# Patient Record
Sex: FEMALE | Race: WHITE | NOT HISPANIC OR LATINO | Employment: FULL TIME | URBAN - METROPOLITAN AREA
[De-identification: names, ages, dates, MRNs, and addresses within clinical notes are randomized per-mention and may not be internally consistent; named-entity substitution may affect disease eponyms.]

---

## 2017-01-23 ENCOUNTER — ALLSCRIPTS OFFICE VISIT (OUTPATIENT)
Dept: OTHER | Facility: OTHER | Age: 37
End: 2017-01-23

## 2017-01-25 LAB
HPV 18 (HISTORICAL): NOT DETECTED
HPV HIGH RISK 16/18 (HISTORICAL): NOT DETECTED
HPV16 (HISTORICAL): NOT DETECTED
PAP (HISTORICAL): NORMAL

## 2018-01-15 VITALS
BODY MASS INDEX: 24.62 KG/M2 | DIASTOLIC BLOOD PRESSURE: 80 MMHG | SYSTOLIC BLOOD PRESSURE: 120 MMHG | HEIGHT: 70 IN | WEIGHT: 172 LBS

## 2018-01-29 ENCOUNTER — OFFICE VISIT (OUTPATIENT)
Dept: OBGYN CLINIC | Facility: MEDICAL CENTER | Age: 38
End: 2018-01-29
Payer: COMMERCIAL

## 2018-01-29 VITALS
WEIGHT: 175.2 LBS | DIASTOLIC BLOOD PRESSURE: 70 MMHG | SYSTOLIC BLOOD PRESSURE: 125 MMHG | BODY MASS INDEX: 25.08 KG/M2 | HEIGHT: 70 IN

## 2018-01-29 DIAGNOSIS — Z01.419 ENCOUNTER FOR GYNECOLOGICAL EXAMINATION WITHOUT ABNORMAL FINDING: Primary | ICD-10-CM

## 2018-01-29 PROCEDURE — S0612 ANNUAL GYNECOLOGICAL EXAMINA: HCPCS | Performed by: OBSTETRICS & GYNECOLOGY

## 2018-01-29 NOTE — PROGRESS NOTES
Assessment/Plan:    No problem-specific Assessment & Plan notes found for this encounter  Diagnoses and all orders for this visit:    Encounter for gynecological examination without abnormal finding    - normal exam   -  follow up in 1 year    Subjective:      Patient ID: Kelly Meehan is a 44 y o  female  Patient doing well - no concerns or complaints today  Kids are good  Has 3year old west retriever - Gerardine Bunting  Occ  SOM with cough, sneeze  Normal cycles  Last pap 2017, q5 years  Mammo age 35      Gynecologic Exam   The patient's pertinent negatives include no pelvic pain, vaginal bleeding or vaginal discharge  Pertinent negatives include no constipation, diarrhea, headaches or painful intercourse  She is sexually active  She uses condoms for contraception  Her menstrual history has been regular  The following portions of the patient's history were reviewed and updated as appropriate: allergies, current medications, past family history, past medical history, past social history, past surgical history and problem list     Past Medical History:   Diagnosis Date    Spontaneous      Varicella      Past Surgical History:   Procedure Laterality Date    TOOTH EXTRACTION       Social History     Social History    Marital status: /Civil Union     Spouse name: N/A    Number of children: N/A    Years of education: N/A     Occupational History    Not on file  Social History Main Topics    Smoking status: Never Smoker    Smokeless tobacco: Never Used    Alcohol use No      Comment: previous social drinker    Drug use: No    Sexual activity: Yes     Partners: Male     Other Topics Concern    Not on file     Social History Narrative    No narrative on file       Review of Systems   Respiratory: Negative for shortness of breath  Cardiovascular: Negative for leg swelling  Gastrointestinal: Negative for constipation and diarrhea     Genitourinary: Negative for pelvic pain and vaginal discharge  Musculoskeletal: Negative for arthralgias and myalgias  Neurological: Negative for dizziness and headaches  Objective:     Physical Exam   Constitutional: She appears well-developed and well-nourished  No distress  Pulmonary/Chest: No respiratory distress  Abdominal: Soft  There is no tenderness  Genitourinary: Vagina normal and uterus normal  No breast swelling, tenderness, discharge or bleeding  There is no rash or lesion on the right labia  There is no rash or lesion on the left labia  Cervix exhibits no motion tenderness and no discharge  Right adnexum displays no mass and no tenderness  Left adnexum displays no mass and no tenderness  Musculoskeletal: She exhibits no edema  Neurological: She is alert  Skin: Skin is warm and dry  Psychiatric: She has a normal mood and affect  Vitals reviewed

## 2018-12-06 ENCOUNTER — OFFICE VISIT (OUTPATIENT)
Dept: OBGYN CLINIC | Facility: MEDICAL CENTER | Age: 38
End: 2018-12-06
Payer: COMMERCIAL

## 2018-12-06 VITALS — BODY MASS INDEX: 26.26 KG/M2 | SYSTOLIC BLOOD PRESSURE: 132 MMHG | DIASTOLIC BLOOD PRESSURE: 70 MMHG | WEIGHT: 180.4 LBS

## 2018-12-06 DIAGNOSIS — N91.2 AMENORRHEA: Primary | ICD-10-CM

## 2018-12-06 PROCEDURE — 76801 OB US < 14 WKS SINGLE FETUS: CPT | Performed by: PHYSICIAN ASSISTANT

## 2018-12-06 NOTE — ASSESSMENT & PLAN NOTE
Confirmed IUP  Will sched f/u with nurse for intake, first prenatal visit  Enc start PNV if has not already, teratogen avoidance  To notify w/ any bleeding, pelvic pain

## 2018-12-06 NOTE — PROGRESS NOTES
Assessment/Plan:    Amenorrhea  Confirmed IUP  Will sched f/u with nurse for intake, first prenatal visit  Enc start PNV if has not already, teratogen avoidance  To notify w/ any bleeding, pelvic pain         Diagnoses and all orders for this visit:    Amenorrhea    Other orders  -     Prenatal Vit-Fe Fumarate-FA (PRENATAL VITAMIN PO); Take by mouth        Subjective:      Patient ID: Bakari Langford is a 43 y o  female  Technician: Study performed by the interpreting physician assistant     Indications:  amenorrhea         LMP 18          Procedure Details  A gestational sac is seen containing a yolk sac and a gonzalez embryo  The embryonic crown-rump length measures 2 9 cm  and calculates to an estimated gestational age of 10 weeks and 6  Days  Embryonic cardiac activity is present @ 180 BPM  Description of fetal anatomy Normal  Description of ovaries: normal  Description of uterus: normal     Findings:  Viable, gonzalez intrauterine pregnancy at 9weeks,  4 days, (no change to [facility])  with a calculated EDC of 19             The following portions of the patient's history were reviewed and updated as appropriate: allergies, current medications, past family history, past medical history, past social history, past surgical history and problem list     Review of Systems   Constitutional: Negative for appetite change, fatigue and unexpected weight change  Respiratory: Negative for chest tightness and shortness of breath  Cardiovascular: Negative for chest pain, palpitations and leg swelling  Gastrointestinal: Negative for abdominal pain, constipation, diarrhea, nausea and vomiting  Genitourinary: Negative for difficulty urinating, dyspareunia, menstrual problem, pelvic pain and vaginal discharge  Musculoskeletal: Negative for arthralgias and myalgias  Neurological: Negative for dizziness, light-headedness and headaches  Psychiatric/Behavioral: Negative for dysphoric mood   The patient is not nervous/anxious  All other systems reviewed and are negative  Objective:      /70 (BP Location: Right arm, Patient Position: Sitting, Cuff Size: Standard)   Wt 81 8 kg (180 lb 6 4 oz)   LMP 09/30/2018   Breastfeeding? No   BMI 26 26 kg/m²          Physical Exam   Constitutional: She is oriented to person, place, and time  She appears well-developed and well-nourished  HENT:   Head: Normocephalic and atraumatic  Neurological: She is alert and oriented to person, place, and time  Skin: Skin is warm and dry  Psychiatric: She has a normal mood and affect  Nursing note and vitals reviewed

## 2018-12-18 ENCOUNTER — APPOINTMENT (OUTPATIENT)
Dept: LAB | Facility: MEDICAL CENTER | Age: 38
End: 2018-12-18
Payer: COMMERCIAL

## 2018-12-18 ENCOUNTER — INITIAL PRENATAL (OUTPATIENT)
Dept: OBGYN CLINIC | Facility: MEDICAL CENTER | Age: 38
End: 2018-12-18

## 2018-12-18 VITALS
DIASTOLIC BLOOD PRESSURE: 68 MMHG | RESPIRATION RATE: 14 BRPM | SYSTOLIC BLOOD PRESSURE: 120 MMHG | HEIGHT: 70 IN | WEIGHT: 179 LBS | BODY MASS INDEX: 25.62 KG/M2

## 2018-12-18 DIAGNOSIS — Z34.81 ENCOUNTER FOR SUPERVISION OF OTHER NORMAL PREGNANCY, FIRST TRIMESTER: ICD-10-CM

## 2018-12-18 DIAGNOSIS — Z34.91 FIRST TRIMESTER PREGNANCY: ICD-10-CM

## 2018-12-18 DIAGNOSIS — Z34.91 FIRST TRIMESTER PREGNANCY: Primary | ICD-10-CM

## 2018-12-18 LAB
ABO GROUP BLD: NORMAL
BACTERIA UR QL AUTO: NORMAL /HPF
BASOPHILS # BLD AUTO: 0.04 THOUSANDS/ΜL (ref 0–0.1)
BASOPHILS NFR BLD AUTO: 0 % (ref 0–1)
BILIRUB UR QL STRIP: NEGATIVE
BLD GP AB SCN SERPL QL: NEGATIVE
CLARITY UR: CLEAR
COLOR UR: YELLOW
EOSINOPHIL # BLD AUTO: 0.01 THOUSAND/ΜL (ref 0–0.61)
EOSINOPHIL NFR BLD AUTO: 0 % (ref 0–6)
ERYTHROCYTE [DISTWIDTH] IN BLOOD BY AUTOMATED COUNT: 12.7 % (ref 11.6–15.1)
GLUCOSE UR STRIP-MCNC: NEGATIVE MG/DL
HBV SURFACE AG SER QL: NORMAL
HCT VFR BLD AUTO: 39.8 % (ref 34.8–46.1)
HGB BLD-MCNC: 13.2 G/DL (ref 11.5–15.4)
HGB UR QL STRIP.AUTO: NEGATIVE
IMM GRANULOCYTES # BLD AUTO: 0.04 THOUSAND/UL (ref 0–0.2)
IMM GRANULOCYTES NFR BLD AUTO: 0 % (ref 0–2)
KETONES UR STRIP-MCNC: NEGATIVE MG/DL
LEUKOCYTE ESTERASE UR QL STRIP: NEGATIVE
LYMPHOCYTES # BLD AUTO: 1.47 THOUSANDS/ΜL (ref 0.6–4.47)
LYMPHOCYTES NFR BLD AUTO: 15 % (ref 14–44)
MCH RBC QN AUTO: 30.9 PG (ref 26.8–34.3)
MCHC RBC AUTO-ENTMCNC: 33.2 G/DL (ref 31.4–37.4)
MCV RBC AUTO: 93 FL (ref 82–98)
MONOCYTES # BLD AUTO: 0.48 THOUSAND/ΜL (ref 0.17–1.22)
MONOCYTES NFR BLD AUTO: 5 % (ref 4–12)
NEUTROPHILS # BLD AUTO: 7.66 THOUSANDS/ΜL (ref 1.85–7.62)
NEUTS SEG NFR BLD AUTO: 80 % (ref 43–75)
NITRITE UR QL STRIP: NEGATIVE
NON-SQ EPI CELLS URNS QL MICRO: NORMAL /HPF
NRBC BLD AUTO-RTO: 0 /100 WBCS
PH UR STRIP.AUTO: 6 [PH] (ref 4.5–8)
PLATELET # BLD AUTO: 204 THOUSANDS/UL (ref 149–390)
PMV BLD AUTO: 12.8 FL (ref 8.9–12.7)
PROT UR STRIP-MCNC: NEGATIVE MG/DL
RBC # BLD AUTO: 4.27 MILLION/UL (ref 3.81–5.12)
RBC #/AREA URNS AUTO: NORMAL /HPF
RH BLD: POSITIVE
RUBV IGG SERPL IA-ACNC: 84 IU/ML
SP GR UR STRIP.AUTO: 1.03 (ref 1–1.03)
SPECIMEN EXPIRATION DATE: NORMAL
UROBILINOGEN UR QL STRIP.AUTO: 0.2 E.U./DL
WBC # BLD AUTO: 9.7 THOUSAND/UL (ref 4.31–10.16)
WBC #/AREA URNS AUTO: NORMAL /HPF

## 2018-12-18 PROCEDURE — 87086 URINE CULTURE/COLONY COUNT: CPT

## 2018-12-18 PROCEDURE — 80081 OBSTETRIC PANEL INC HIV TSTG: CPT

## 2018-12-18 PROCEDURE — 36415 COLL VENOUS BLD VENIPUNCTURE: CPT

## 2018-12-18 PROCEDURE — OBC: Performed by: OBSTETRICS & GYNECOLOGY

## 2018-12-18 PROCEDURE — 81001 URINALYSIS AUTO W/SCOPE: CPT

## 2018-12-18 NOTE — PROGRESS NOTES
OB INTAKE INTERVIEW  * Pt presents for OB intake YES  * Accompanied by: By her 3year old daughter Jazmin Thompson   *G 5 P2  *Hx of  delivery prior to 36 weeks 6 days  NO  *Last Menstrual Period: Pt's LMP was 2018  *Ultrasound date:2018   9 weeks  6 days  *Estimated date of delivery: 2019   * confirmed by LMP     *Signs/Symptoms of Pregnancy   * NONE   *constipation NO   *headaches NO   *cramping/spotting NO   *PICA cravings NO  *Diabetes- if you answer yes, please order 1 hour GTT, 50g   *hx of GDM NO   *BMI >35  NO   *first degree relative with type 2 diabetes  YES Patient reports that her Paternal Aunt has type 2 diabetes and her PGM has type 2 diabetes  *hx of PCOS NO   *current metformin use NO   *prior hx of LGA/macrosomia NO   *AMA with other risk factors YES Patient is 43years old she will be 35years old by the time she delivers  *Hypertension- if you answer yes, please order preeclampsia labs including 24 hour urine protein   *Hx of chronic HTN NO   *hx of gestational HTN YES with her son pregnancy    *hx of preeclampsia, eclampsia, or HELLP syndrome NO  *Infection Screening-    *does the pt have a hx of MRSA? NO   *if yes- please follow MRSA protocol and obtain a nasal swab for MRSA culture  *Immunizations:   *influenza vaccine given today NO   *discussed Tdap vaccine YES    *Interview education   *[facility] Pregnancy Essentials Book reviewed and discussed   *Handouts given: YES    *Baby and Me phone ivy guide YES    *Baby and Me support center YEs    *[facility] M     *discussed genetic testing- pt interested  YES     *appointment at [place] [id number] made YES    *Prenatal lab work scripts Given at today's visit and patient is doing her prenatal blood work after the [facility] Intake visit  *I have these concerns about this prenatal patient:  Patient has gestational high blood pressure with her first pregnancy     *Details that I feel the provider should be aware of: Patient son has Autism she had two vaginal deliveries   PN1 visit scheduled  The patient was oriented to our practice and all questions were answered  PN1 was schedule at today's visit  Interviewed by: YEFRI HOLDEN    Patient reports that this was a surprise pregnancy they were not even trying but they are very happy   Patient reports that no one in the family know yet of the big news they are going to tell the children on fahad day   Patient is planning to breast feed the baby she reports that they varma not have any cats at the house she does have a dog   Patient reports that they do not have any traveling plans during her pregnancy

## 2018-12-19 LAB
BACTERIA UR CULT: NORMAL
HIV 1+2 AB+HIV1 P24 AG SERPL QL IA: NORMAL
RPR SER QL: NORMAL

## 2019-01-03 ENCOUNTER — INITIAL PRENATAL (OUTPATIENT)
Dept: OBGYN CLINIC | Facility: MEDICAL CENTER | Age: 39
End: 2019-01-03

## 2019-01-03 ENCOUNTER — ROUTINE PRENATAL (OUTPATIENT)
Dept: PERINATAL CARE | Facility: MEDICAL CENTER | Age: 39
End: 2019-01-03
Payer: COMMERCIAL

## 2019-01-03 VITALS
BODY MASS INDEX: 26.66 KG/M2 | HEIGHT: 69 IN | DIASTOLIC BLOOD PRESSURE: 61 MMHG | HEART RATE: 98 BPM | WEIGHT: 180 LBS | SYSTOLIC BLOOD PRESSURE: 128 MMHG

## 2019-01-03 VITALS — SYSTOLIC BLOOD PRESSURE: 120 MMHG | WEIGHT: 180 LBS | BODY MASS INDEX: 26.58 KG/M2 | DIASTOLIC BLOOD PRESSURE: 72 MMHG

## 2019-01-03 DIAGNOSIS — Z34.91 FIRST TRIMESTER PREGNANCY: ICD-10-CM

## 2019-01-03 DIAGNOSIS — Z3A.13 13 WEEKS GESTATION OF PREGNANCY: ICD-10-CM

## 2019-01-03 DIAGNOSIS — Z3A.13 13 WEEKS GESTATION OF PREGNANCY: Primary | ICD-10-CM

## 2019-01-03 DIAGNOSIS — Z36.82 ENCOUNTER FOR NUCHAL TRANSLUCENCY TESTING: ICD-10-CM

## 2019-01-03 DIAGNOSIS — Z87.59 HISTORY OF GESTATIONAL HYPERTENSION: ICD-10-CM

## 2019-01-03 DIAGNOSIS — O09.521 ELDERLY MULTIGRAVIDA IN FIRST TRIMESTER: Primary | ICD-10-CM

## 2019-01-03 PROBLEM — N91.2 AMENORRHEA: Status: RESOLVED | Noted: 2018-12-06 | Resolved: 2019-01-03

## 2019-01-03 PROCEDURE — 76801 OB US < 14 WKS SINGLE FETUS: CPT | Performed by: OBSTETRICS & GYNECOLOGY

## 2019-01-03 PROCEDURE — 76813 OB US NUCHAL MEAS 1 GEST: CPT | Performed by: OBSTETRICS & GYNECOLOGY

## 2019-01-03 PROCEDURE — 99241 PR OFFICE CONSULTATION NEW/ESTAB PATIENT 15 MIN: CPT | Performed by: OBSTETRICS & GYNECOLOGY

## 2019-01-03 PROCEDURE — PNV: Performed by: PHYSICIAN ASSISTANT

## 2019-01-03 PROCEDURE — 87591 N.GONORRHOEAE DNA AMP PROB: CPT | Performed by: PHYSICIAN ASSISTANT

## 2019-01-03 PROCEDURE — 87491 CHLMYD TRACH DNA AMP PROBE: CPT | Performed by: PHYSICIAN ASSISTANT

## 2019-01-03 NOTE — PROGRESS NOTES
The patient was seen today for an ultrasound  Please see ultrasound report (located under Ob Procedures) for additional details  Thank you very much for allowing us to participate in the care of this very nice patient  Should you have any questions, please do not hesitate to contact me  Eder Daugherty MD [address] [facility]  Attending Physician, Cristal

## 2019-01-03 NOTE — PROGRESS NOTES
13 weeks gestation of pregnancy  Pt presents for first prenatal visit  PNL completed, reviewed - WNL  Rh pos  PAP up to date  GC/CT cx today  Declines flu vacc    No VB or pelvic pain per pt  Reviewed reasons to call    Elderly multigravida in first trimester  Had genetics consult w/ MFM  Plans to complete cell free DNA testing  NT today - WNL    History of gestational hypertension  H/o gHTN in 1st pregnancy  Will check baseline labs/urine studies  To start ASA regimen as d/w M

## 2019-01-03 NOTE — ASSESSMENT & PLAN NOTE
Pt presents for first prenatal visit  PNL completed, reviewed - WNL  Rh pos  PAP up to date  GC/CT cx today  Declines flu vacc    No VB or pelvic pain per pt  Reviewed reasons to call

## 2019-01-04 ENCOUNTER — DOCUMENTATION (OUTPATIENT)
Dept: PERINATAL CARE | Facility: CLINIC | Age: 39
End: 2019-01-04

## 2019-01-04 NOTE — PROGRESS NOTES
Pt notified and aware NIPT approved by [employer]  Auth #[id number] from 01/03-03/01/2019  Pt aware to review insurance disclaimers and coinsurance and deductibles  Pt verblalzed understanding  TRF mailed to pt and pt aware to contact [facility] if she decides to decline testing after insurance investigation on cost   Info for copay assistance from [employer]  sent to pt with trf

## 2019-01-07 LAB
C TRACH DNA SPEC QL NAA+PROBE: NEGATIVE
N GONORRHOEA DNA SPEC QL NAA+PROBE: NEGATIVE

## 2019-01-08 ENCOUNTER — TELEPHONE (OUTPATIENT)
Dept: OBGYN CLINIC | Facility: CLINIC | Age: 39
End: 2019-01-08

## 2019-01-10 ENCOUNTER — TELEPHONE (OUTPATIENT)
Dept: PERINATAL CARE | Facility: CLINIC | Age: 39
End: 2019-01-10

## 2019-01-16 LAB
# FETUSES US: 1
CFDNA.FET/TOTAL PLAS.CFDNA: NORMAL
FET CHR 13 TS PLAS.CFDNA QL: NEGATIVE
FET CHR 18 TS PLAS.CFDNA QL: NEGATIVE
FET CHR 21 TS PLAS.CFDNA QL: NEGATIVE
FET CHR X + Y ANEUP PLAS.CFDNA QL: NORMAL
FET CHROM X + Y ANEUP CFDNA IMP: NORMAL
FET Y CHROM PLAS.CFDNA QL: NOT DETECTED
FET Y CHROM PLAS.CFDNA: NORMAL
GA (DAYS): 5 D
GA (WEEKS): 14 WK
MICRODELETION SYND BLD/T FISH: NORMAL
REF LAB TEST METHOD: NORMAL
SERVICE CMNT-IMP: NORMAL
SERVICE CMNT-IMP: NORMAL
SL AMB ABNORMAL MSS?: NORMAL
SL AMB ABNORMAL US?: NORMAL
SL AMB ADVANCED MATERNAL AGE?: YES
SL AMB MICRODELETION INTERP: NORMAL
SL AMB MICRODELETION: NOT DETECTED
SL AMB PERSONAL/FAM HISTORY?: NORMAL
SL AMB SPECIFICATIONS: NORMAL

## 2019-01-22 ENCOUNTER — TELEPHONE (OUTPATIENT)
Dept: PERINATAL CARE | Facility: CLINIC | Age: 39
End: 2019-01-22

## 2019-01-22 NOTE — PROGRESS NOTES
I have reviewed the results which are low risk  Please call patient and notify her of reassuring results if she has not viewed on [employer]  Thank you

## 2019-01-22 NOTE — TELEPHONE ENCOUNTER
----- Message from Rayna Hardy MD sent at 1/22/2019  9:30 AM EST -----  I have reviewed the results which are low risk  Please call patient and notify her of reassuring results if she has not viewed on [employer]  Thank you

## 2019-01-30 ENCOUNTER — APPOINTMENT (OUTPATIENT)
Dept: LAB | Facility: MEDICAL CENTER | Age: 39
End: 2019-01-30
Payer: COMMERCIAL

## 2019-01-30 DIAGNOSIS — Z3A.13 13 WEEKS GESTATION OF PREGNANCY: ICD-10-CM

## 2019-01-30 DIAGNOSIS — Z87.59 HISTORY OF GESTATIONAL HYPERTENSION: ICD-10-CM

## 2019-01-30 LAB
ALBUMIN SERPL BCP-MCNC: 3.5 G/DL (ref 3.5–5)
ALP SERPL-CCNC: 63 U/L (ref 46–116)
ALT SERPL W P-5'-P-CCNC: 21 U/L (ref 12–78)
ANION GAP SERPL CALCULATED.3IONS-SCNC: 5 MMOL/L (ref 4–13)
AST SERPL W P-5'-P-CCNC: 15 U/L (ref 5–45)
BILIRUB SERPL-MCNC: 0.27 MG/DL (ref 0.2–1)
BUN SERPL-MCNC: 10 MG/DL (ref 5–25)
CALCIUM SERPL-MCNC: 9.8 MG/DL (ref 8.3–10.1)
CHLORIDE SERPL-SCNC: 103 MMOL/L (ref 100–108)
CO2 SERPL-SCNC: 26 MMOL/L (ref 21–32)
CREAT SERPL-MCNC: 0.62 MG/DL (ref 0.6–1.3)
CREAT UR-MCNC: 204 MG/DL
GFR SERPL CREATININE-BSD FRML MDRD: 114 ML/MIN/1.73SQ M
GLUCOSE SERPL-MCNC: 80 MG/DL (ref 65–140)
POTASSIUM SERPL-SCNC: 4.1 MMOL/L (ref 3.5–5.3)
PROT 24H UR-MCNC: <160.5 MG/24 HRS (ref 40–150)
PROT SERPL-MCNC: 7.4 G/DL (ref 6.4–8.2)
PROT UR-MCNC: <6 MG/DL
PROT/CREAT UR: <0.03 MG/G{CREAT} (ref 0–0.1)
SODIUM SERPL-SCNC: 134 MMOL/L (ref 136–145)
SPECIMEN VOL UR: 2675 ML
URATE SERPL-MCNC: 3.1 MG/DL (ref 2–6.8)

## 2019-01-30 PROCEDURE — 84156 ASSAY OF PROTEIN URINE: CPT

## 2019-01-30 PROCEDURE — 36415 COLL VENOUS BLD VENIPUNCTURE: CPT

## 2019-01-30 PROCEDURE — 80053 COMPREHEN METABOLIC PANEL: CPT

## 2019-01-30 PROCEDURE — 84550 ASSAY OF BLOOD/URIC ACID: CPT

## 2019-01-30 PROCEDURE — 82570 ASSAY OF URINE CREATININE: CPT

## 2019-01-31 ENCOUNTER — TELEPHONE (OUTPATIENT)
Dept: OBGYN CLINIC | Facility: CLINIC | Age: 39
End: 2019-01-31

## 2019-01-31 ENCOUNTER — ROUTINE PRENATAL (OUTPATIENT)
Dept: OBGYN CLINIC | Facility: MEDICAL CENTER | Age: 39
End: 2019-01-31

## 2019-01-31 VITALS — SYSTOLIC BLOOD PRESSURE: 124 MMHG | WEIGHT: 187.8 LBS | DIASTOLIC BLOOD PRESSURE: 80 MMHG | BODY MASS INDEX: 27.73 KG/M2

## 2019-01-31 DIAGNOSIS — Z3A.17 17 WEEKS GESTATION OF PREGNANCY: ICD-10-CM

## 2019-01-31 DIAGNOSIS — Z87.59 HISTORY OF GESTATIONAL HYPERTENSION: ICD-10-CM

## 2019-01-31 DIAGNOSIS — Z34.92 ENCOUNTER FOR SUPERVISION OF NORMAL PREGNANCY IN SECOND TRIMESTER, UNSPECIFIED GRAVIDITY: Primary | ICD-10-CM

## 2019-01-31 DIAGNOSIS — O09.522 ELDERLY MULTIGRAVIDA IN SECOND TRIMESTER: ICD-10-CM

## 2019-01-31 PROCEDURE — PNV: Performed by: NURSE PRACTITIONER

## 2019-01-31 NOTE — PROGRESS NOTES
Problem List Items Addressed This Visit     17 weeks gestation of pregnancy    Elderly multigravida in second trimester     Normal cffDNA  Gender to be revealed at L2  History of gestational hypertension     Baseline preE labs were WNL  Encounter for supervision of normal pregnancy in second trimester - Primary     Denies OB complaints  Good fetal movement  Denies contractions, cramping, leakage of fluid or vaginal bleeding  L2 scheduled  Declines flu vaccine  Reviewed reasons to call

## 2019-01-31 NOTE — ASSESSMENT & PLAN NOTE
Denies OB complaints  Good fetal movement  Denies contractions, cramping, leakage of fluid or vaginal bleeding  L2 scheduled  Declines flu vaccine  Reviewed reasons to call

## 2019-02-04 LAB
# FETUSES US: 1
AFP ADJ MOM SERPL: 1.05
AFP INTERP SERPL-IMP: NORMAL
AFP SERPL-MCNC: 36.7 NG/ML
AGE: NORMAL
DONATED EGG PATIENT QL: NO
GA CLIN EST: 17.4 WEEKS
GA METHOD: NORMAL
HX OF NTD NARR: NO
HX OF TRISOMY 21 NARR: NO
IDDM PATIENT QL: NO
NEURAL TUBE DEFECT RISK FETUS: NORMAL %
SL AMB REPEAT SPECIMEN: NO

## 2019-02-04 NOTE — PROGRESS NOTES
I have reviewed the results which are low risk  Please call patient and notify her of reassuring results if she has not viewed on [employer]  Thank you

## 2019-02-05 ENCOUNTER — TELEPHONE (OUTPATIENT)
Dept: PERINATAL CARE | Facility: CLINIC | Age: 39
End: 2019-02-05

## 2019-02-05 NOTE — TELEPHONE ENCOUNTER
----- Message from Griselda Hilton MD sent at 2/4/2019  3:36 PM EST -----  I have reviewed the results which are low risk  Please call patient and notify her of reassuring results if she has not viewed on [employer]  Thank you

## 2019-02-05 NOTE — TELEPHONE ENCOUNTER
Spoke with pt and provided her with results of MSAFP  Pt receptive to information and declines questions at this time

## 2019-02-19 ENCOUNTER — ROUTINE PRENATAL (OUTPATIENT)
Dept: PERINATAL CARE | Facility: MEDICAL CENTER | Age: 39
End: 2019-02-19
Payer: COMMERCIAL

## 2019-02-19 VITALS
DIASTOLIC BLOOD PRESSURE: 84 MMHG | BODY MASS INDEX: 27.35 KG/M2 | SYSTOLIC BLOOD PRESSURE: 122 MMHG | WEIGHT: 191 LBS | HEART RATE: 101 BPM | HEIGHT: 70 IN

## 2019-02-19 DIAGNOSIS — Z3A.20 20 WEEKS GESTATION OF PREGNANCY: ICD-10-CM

## 2019-02-19 DIAGNOSIS — O09.522 ELDERLY MULTIGRAVIDA IN SECOND TRIMESTER: Primary | ICD-10-CM

## 2019-02-19 DIAGNOSIS — Z36.86 ENCOUNTER FOR ANTENATAL SCREENING FOR CERVICAL LENGTH: ICD-10-CM

## 2019-02-19 DIAGNOSIS — Z36.3 ENCOUNTER FOR ANTENATAL SCREENING FOR MALFORMATIONS: ICD-10-CM

## 2019-02-19 PROCEDURE — 99212 OFFICE O/P EST SF 10 MIN: CPT | Performed by: OBSTETRICS & GYNECOLOGY

## 2019-02-19 PROCEDURE — 76817 TRANSVAGINAL US OBSTETRIC: CPT | Performed by: OBSTETRICS & GYNECOLOGY

## 2019-02-19 PROCEDURE — 76811 OB US DETAILED SNGL FETUS: CPT | Performed by: OBSTETRICS & GYNECOLOGY

## 2019-02-19 NOTE — PROGRESS NOTES
[address] [facility] [place]: Ms Savita Oliver was seen today at 20w2d for anatomic survey and cervical length screening ultrasound  See ultrasound report under "OB Procedures" tab  Please don't hesitate to contact our office with any concerns or questions    Edda Davis MD

## 2019-02-19 NOTE — PROGRESS NOTES
A transvaginal ultrasound was performed  Sonographer note on use of High Level Disinfection Process (Trophon) for transvaginal probe#2 used, serial [id number]    Elizabeth Hsu, [address]

## 2019-02-19 NOTE — PATIENT INSTRUCTIONS
Thank you for choosing [facility] for your  care today  If you have any questions about your ultrasound or care, please do not hesitate to contact us or your primary obstetrician

## 2019-02-28 ENCOUNTER — ROUTINE PRENATAL (OUTPATIENT)
Dept: OBGYN CLINIC | Facility: MEDICAL CENTER | Age: 39
End: 2019-02-28

## 2019-02-28 VITALS
SYSTOLIC BLOOD PRESSURE: 120 MMHG | RESPIRATION RATE: 14 BRPM | HEIGHT: 70 IN | BODY MASS INDEX: 27.06 KG/M2 | DIASTOLIC BLOOD PRESSURE: 70 MMHG | WEIGHT: 189 LBS

## 2019-02-28 DIAGNOSIS — Z3A.21 21 WEEKS GESTATION OF PREGNANCY: Primary | ICD-10-CM

## 2019-02-28 PROCEDURE — PNV: Performed by: OBSTETRICS & GYNECOLOGY

## 2019-02-28 NOTE — PROGRESS NOTES
Patient is a 39-year-old female, para 2, at 21 weeks and 4 days here for routine prenatal visit without complaint  Review of systems is positive for fetal movement negative for loss of fluid vaginal bleeding or regular contractions

## 2019-03-26 ENCOUNTER — ROUTINE PRENATAL (OUTPATIENT)
Dept: OBGYN CLINIC | Facility: MEDICAL CENTER | Age: 39
End: 2019-03-26

## 2019-03-26 VITALS
WEIGHT: 196 LBS | BODY MASS INDEX: 28.06 KG/M2 | HEIGHT: 70 IN | DIASTOLIC BLOOD PRESSURE: 70 MMHG | SYSTOLIC BLOOD PRESSURE: 124 MMHG | RESPIRATION RATE: 14 BRPM

## 2019-03-26 DIAGNOSIS — Z34.82 ENCOUNTER FOR SUPERVISION OF OTHER NORMAL PREGNANCY IN SECOND TRIMESTER: Primary | ICD-10-CM

## 2019-03-26 DIAGNOSIS — O09.522 ELDERLY MULTIGRAVIDA IN SECOND TRIMESTER: ICD-10-CM

## 2019-03-26 DIAGNOSIS — Z34.92 SECOND TRIMESTER PREGNANCY: ICD-10-CM

## 2019-03-26 PROCEDURE — PNV: Performed by: PHYSICIAN ASSISTANT

## 2019-03-26 NOTE — PROGRESS NOTES
Patient w/o complaints  (+) good fetal movement, denies any bleeding, fluid leakage or ctx  Level 2 WNL, some missed anatomy for 28wk growth US    Problem List Items Addressed This Visit        Other    Elderly multigravida in second trimester    Encounter for supervision of normal pregnancy in second trimester - Primary     RTO 4 wks  28wk lab slip given  Reviewed reasons to call            Other Visit Diagnoses     Second trimester pregnancy        Relevant Orders    Glucose, 1H PG    RPR    CBC and differential

## 2019-04-09 ENCOUNTER — APPOINTMENT (OUTPATIENT)
Dept: LAB | Facility: MEDICAL CENTER | Age: 39
End: 2019-04-09
Payer: COMMERCIAL

## 2019-04-09 DIAGNOSIS — Z34.92 SECOND TRIMESTER PREGNANCY: ICD-10-CM

## 2019-04-09 LAB
BASOPHILS # BLD AUTO: 0.03 THOUSANDS/ΜL (ref 0–0.1)
BASOPHILS NFR BLD AUTO: 0 % (ref 0–1)
EOSINOPHIL # BLD AUTO: 0.02 THOUSAND/ΜL (ref 0–0.61)
EOSINOPHIL NFR BLD AUTO: 0 % (ref 0–6)
ERYTHROCYTE [DISTWIDTH] IN BLOOD BY AUTOMATED COUNT: 12.9 % (ref 11.6–15.1)
GLUCOSE 1H P 50 G GLC PO SERPL-MCNC: 85 MG/DL
HCT VFR BLD AUTO: 36.4 % (ref 34.8–46.1)
HGB BLD-MCNC: 11.7 G/DL (ref 11.5–15.4)
IMM GRANULOCYTES # BLD AUTO: 0.07 THOUSAND/UL (ref 0–0.2)
IMM GRANULOCYTES NFR BLD AUTO: 1 % (ref 0–2)
LYMPHOCYTES # BLD AUTO: 1.06 THOUSANDS/ΜL (ref 0.6–4.47)
LYMPHOCYTES NFR BLD AUTO: 10 % (ref 14–44)
MCH RBC QN AUTO: 31.2 PG (ref 26.8–34.3)
MCHC RBC AUTO-ENTMCNC: 32.1 G/DL (ref 31.4–37.4)
MCV RBC AUTO: 97 FL (ref 82–98)
MONOCYTES # BLD AUTO: 0.51 THOUSAND/ΜL (ref 0.17–1.22)
MONOCYTES NFR BLD AUTO: 5 % (ref 4–12)
NEUTROPHILS # BLD AUTO: 8.59 THOUSANDS/ΜL (ref 1.85–7.62)
NEUTS SEG NFR BLD AUTO: 84 % (ref 43–75)
NRBC BLD AUTO-RTO: 0 /100 WBCS
PLATELET # BLD AUTO: 167 THOUSANDS/UL (ref 149–390)
PMV BLD AUTO: 12.2 FL (ref 8.9–12.7)
RBC # BLD AUTO: 3.75 MILLION/UL (ref 3.81–5.12)
WBC # BLD AUTO: 10.28 THOUSAND/UL (ref 4.31–10.16)

## 2019-04-09 PROCEDURE — 86592 SYPHILIS TEST NON-TREP QUAL: CPT

## 2019-04-09 PROCEDURE — 36415 COLL VENOUS BLD VENIPUNCTURE: CPT

## 2019-04-09 PROCEDURE — 82950 GLUCOSE TEST: CPT

## 2019-04-09 PROCEDURE — 85025 COMPLETE CBC W/AUTO DIFF WBC: CPT

## 2019-04-10 LAB — RPR SER QL: NORMAL

## 2019-04-11 ENCOUNTER — TELEPHONE (OUTPATIENT)
Dept: OBGYN CLINIC | Facility: CLINIC | Age: 39
End: 2019-04-11

## 2019-04-16 ENCOUNTER — ULTRASOUND (OUTPATIENT)
Dept: PERINATAL CARE | Facility: MEDICAL CENTER | Age: 39
End: 2019-04-16
Payer: COMMERCIAL

## 2019-04-16 VITALS
HEIGHT: 70 IN | HEART RATE: 99 BPM | DIASTOLIC BLOOD PRESSURE: 84 MMHG | SYSTOLIC BLOOD PRESSURE: 138 MMHG | WEIGHT: 201.2 LBS | BODY MASS INDEX: 28.8 KG/M2

## 2019-04-16 DIAGNOSIS — Z3A.28 28 WEEKS GESTATION OF PREGNANCY: ICD-10-CM

## 2019-04-16 DIAGNOSIS — O09.522 ELDERLY MULTIGRAVIDA IN SECOND TRIMESTER: ICD-10-CM

## 2019-04-16 PROCEDURE — 76816 OB US FOLLOW-UP PER FETUS: CPT | Performed by: OBSTETRICS & GYNECOLOGY

## 2019-04-18 ENCOUNTER — ROUTINE PRENATAL (OUTPATIENT)
Dept: OBGYN CLINIC | Facility: MEDICAL CENTER | Age: 39
End: 2019-04-18

## 2019-04-18 VITALS — WEIGHT: 199 LBS | SYSTOLIC BLOOD PRESSURE: 138 MMHG | BODY MASS INDEX: 28.97 KG/M2 | DIASTOLIC BLOOD PRESSURE: 70 MMHG

## 2019-04-18 DIAGNOSIS — Z23 NEED FOR TDAP VACCINATION: Primary | ICD-10-CM

## 2019-04-18 PROBLEM — O09.523 MULTIGRAVIDA OF ADVANCED MATERNAL AGE IN THIRD TRIMESTER: Status: ACTIVE | Noted: 2019-04-18

## 2019-04-18 PROBLEM — Z3A.20 20 WEEKS GESTATION OF PREGNANCY: Status: RESOLVED | Noted: 2019-01-03 | Resolved: 2019-04-18

## 2019-04-18 PROBLEM — O09.522 ELDERLY MULTIGRAVIDA IN SECOND TRIMESTER: Status: RESOLVED | Noted: 2019-01-03 | Resolved: 2019-04-18

## 2019-04-18 PROBLEM — Z34.93 ENCOUNTER FOR SUPERVISION OF NORMAL PREGNANCY IN THIRD TRIMESTER: Status: ACTIVE | Noted: 2019-04-18

## 2019-04-18 PROBLEM — Z34.92 ENCOUNTER FOR SUPERVISION OF NORMAL PREGNANCY IN SECOND TRIMESTER: Status: RESOLVED | Noted: 2019-01-31 | Resolved: 2019-04-18

## 2019-04-18 PROCEDURE — PNV: Performed by: PHYSICIAN ASSISTANT

## 2019-05-02 ENCOUNTER — ROUTINE PRENATAL (OUTPATIENT)
Dept: OBGYN CLINIC | Facility: MEDICAL CENTER | Age: 39
End: 2019-05-02

## 2019-05-02 VITALS — WEIGHT: 202 LBS | DIASTOLIC BLOOD PRESSURE: 72 MMHG | BODY MASS INDEX: 29.4 KG/M2 | SYSTOLIC BLOOD PRESSURE: 138 MMHG

## 2019-05-02 DIAGNOSIS — Z34.93 ENCOUNTER FOR SUPERVISION OF NORMAL PREGNANCY IN THIRD TRIMESTER, UNSPECIFIED GRAVIDITY: Primary | ICD-10-CM

## 2019-05-02 PROCEDURE — PNV: Performed by: NURSE PRACTITIONER

## 2019-05-16 ENCOUNTER — ROUTINE PRENATAL (OUTPATIENT)
Dept: OBGYN CLINIC | Facility: MEDICAL CENTER | Age: 39
End: 2019-05-16

## 2019-05-16 VITALS — WEIGHT: 207.4 LBS | SYSTOLIC BLOOD PRESSURE: 138 MMHG | BODY MASS INDEX: 30.19 KG/M2 | DIASTOLIC BLOOD PRESSURE: 82 MMHG

## 2019-05-16 DIAGNOSIS — Z23 NEED FOR TDAP VACCINATION: Primary | ICD-10-CM

## 2019-05-16 PROCEDURE — PNV: Performed by: PHYSICIAN ASSISTANT

## 2019-05-28 ENCOUNTER — ULTRASOUND (OUTPATIENT)
Dept: PERINATAL CARE | Facility: MEDICAL CENTER | Age: 39
End: 2019-05-28
Payer: COMMERCIAL

## 2019-05-28 ENCOUNTER — HOSPITAL ENCOUNTER (INPATIENT)
Facility: HOSPITAL | Age: 39
LOS: 3 days | Discharge: HOME | End: 2019-05-31
Attending: OBSTETRICS & GYNECOLOGY | Admitting: OBSTETRICS & GYNECOLOGY
Payer: COMMERCIAL

## 2019-05-28 VITALS
DIASTOLIC BLOOD PRESSURE: 78 MMHG | HEART RATE: 102 BPM | WEIGHT: 210 LBS | SYSTOLIC BLOOD PRESSURE: 125 MMHG | BODY MASS INDEX: 30.06 KG/M2 | HEIGHT: 70 IN

## 2019-05-28 DIAGNOSIS — IMO0002 EVALUATE ANATOMY NOT SEEN ON PRIOR SONOGRAM: ICD-10-CM

## 2019-05-28 DIAGNOSIS — Z36.89 ENCOUNTER FOR ULTRASOUND TO CHECK FETAL GROWTH: ICD-10-CM

## 2019-05-28 DIAGNOSIS — O42.919 PRETERM PREMATURE RUPTURE OF MEMBRANES, ANTEPARTUM: Primary | ICD-10-CM

## 2019-05-28 DIAGNOSIS — O09.523 MULTIGRAVIDA OF ADVANCED MATERNAL AGE IN THIRD TRIMESTER: ICD-10-CM

## 2019-05-28 DIAGNOSIS — O42.919 PRETERM PREMATURE RUPTURE OF MEMBRANES, ANTEPARTUM: ICD-10-CM

## 2019-05-28 DIAGNOSIS — Z3A.34 34 WEEKS GESTATION OF PREGNANCY: ICD-10-CM

## 2019-05-28 LAB
ABO GROUP BLD: NORMAL
BASE EXCESS BLDCOA CALC-SCNC: -2.5 MMOL/L (ref 3–11)
BASE EXCESS BLDCOV CALC-SCNC: -3.3 MMOL/L (ref 1–9)
BASOPHILS # BLD MANUAL: 0 THOUSAND/UL (ref 0–0.1)
BASOPHILS NFR MAR MANUAL: 0 % (ref 0–1)
BLD GP AB SCN SERPL QL: NEGATIVE
EOSINOPHIL # BLD MANUAL: 0 THOUSAND/UL (ref 0–0.4)
EOSINOPHIL NFR BLD MANUAL: 0 % (ref 0–6)
ERYTHROCYTE [DISTWIDTH] IN BLOOD BY AUTOMATED COUNT: 13 % (ref 11.6–15.1)
HCO3 BLDCOA-SCNC: 23 MMOL/L (ref 17.3–27.3)
HCO3 BLDCOV-SCNC: 18.4 MMOL/L (ref 12.2–28.6)
HCT VFR BLD AUTO: 36.1 % (ref 34.8–46.1)
HGB BLD-MCNC: 12 G/DL (ref 11.5–15.4)
LYMPHOCYTES # BLD AUTO: 0.44 THOUSAND/UL (ref 0.6–4.47)
LYMPHOCYTES # BLD AUTO: 3 % (ref 14–44)
MCH RBC QN AUTO: 31.1 PG (ref 26.8–34.3)
MCHC RBC AUTO-ENTMCNC: 33.2 G/DL (ref 31.4–37.4)
MCV RBC AUTO: 94 FL (ref 82–98)
MONOCYTES # BLD AUTO: 0.15 THOUSAND/UL (ref 0–1.22)
MONOCYTES NFR BLD: 1 % (ref 4–12)
NEUTROPHILS # BLD MANUAL: 13.79 THOUSAND/UL (ref 1.85–7.62)
NEUTS SEG NFR BLD AUTO: 95 % (ref 43–75)
NRBC BLD AUTO-RTO: 0 /100 WBCS
O2 CT VFR BLDCOA CALC: 4.8 ML/DL
OXYHGB MFR BLDCOA: 22.3 %
OXYHGB MFR BLDCOV: 62.7 %
PCO2 BLDCOA: 42.2 MM[HG] (ref 30–60)
PCO2 BLDCOV: 26 MM HG (ref 27–43)
PH BLDCOA: 7.35 [PH] (ref 7.23–7.43)
PH BLDCOV: 7.47 [PH] (ref 7.19–7.49)
PLATELET # BLD AUTO: 152 THOUSANDS/UL (ref 149–390)
PLATELET BLD QL SMEAR: ADEQUATE
PMV BLD AUTO: 12 FL (ref 8.9–12.7)
PO2 BLDCOA: 12.3 MM HG (ref 5–25)
PO2 BLDCOV: 22.3 MM HG (ref 15–45)
POLYCHROMASIA BLD QL SMEAR: PRESENT
RBC # BLD AUTO: 3.86 MILLION/UL (ref 3.81–5.12)
RBC MORPH BLD: PRESENT
RH BLD: POSITIVE
SAO2 % BLDCOV: 14.4 ML/DL
SPECIMEN EXPIRATION DATE: NORMAL
VARIANT LYMPHS # BLD AUTO: 1 %
WBC # BLD AUTO: 14.52 THOUSAND/UL (ref 4.31–10.16)

## 2019-05-28 PROCEDURE — 86900 BLOOD TYPING SEROLOGIC ABO: CPT | Performed by: OBSTETRICS & GYNECOLOGY

## 2019-05-28 PROCEDURE — 87653 STREP B DNA AMP PROBE: CPT | Performed by: OBSTETRICS & GYNECOLOGY

## 2019-05-28 PROCEDURE — 85007 BL SMEAR W/DIFF WBC COUNT: CPT | Performed by: OBSTETRICS & GYNECOLOGY

## 2019-05-28 PROCEDURE — 87147 CULTURE TYPE IMMUNOLOGIC: CPT | Performed by: OBSTETRICS & GYNECOLOGY

## 2019-05-28 PROCEDURE — 88307 TISSUE EXAM BY PATHOLOGIST: CPT | Performed by: PATHOLOGY

## 2019-05-28 PROCEDURE — 87491 CHLMYD TRACH DNA AMP PROBE: CPT | Performed by: OBSTETRICS & GYNECOLOGY

## 2019-05-28 PROCEDURE — 59400 OBSTETRICAL CARE: CPT | Performed by: OBSTETRICS & GYNECOLOGY

## 2019-05-28 PROCEDURE — 76816 OB US FOLLOW-UP PER FETUS: CPT | Performed by: OBSTETRICS & GYNECOLOGY

## 2019-05-28 PROCEDURE — 82805 BLOOD GASES W/O2 SATURATION: CPT | Performed by: OBSTETRICS & GYNECOLOGY

## 2019-05-28 PROCEDURE — 85027 COMPLETE CBC AUTOMATED: CPT | Performed by: OBSTETRICS & GYNECOLOGY

## 2019-05-28 PROCEDURE — 87086 URINE CULTURE/COLONY COUNT: CPT | Performed by: OBSTETRICS & GYNECOLOGY

## 2019-05-28 PROCEDURE — 86850 RBC ANTIBODY SCREEN: CPT | Performed by: OBSTETRICS & GYNECOLOGY

## 2019-05-28 PROCEDURE — 0KQM0ZZ REPAIR PERINEUM MUSCLE, OPEN APPROACH: ICD-10-PCS | Performed by: OBSTETRICS & GYNECOLOGY

## 2019-05-28 PROCEDURE — NC001 PR NO CHARGE: Performed by: OBSTETRICS & GYNECOLOGY

## 2019-05-28 PROCEDURE — 87591 N.GONORRHOEAE DNA AMP PROB: CPT | Performed by: OBSTETRICS & GYNECOLOGY

## 2019-05-28 PROCEDURE — 86592 SYPHILIS TEST NON-TREP QUAL: CPT | Performed by: OBSTETRICS & GYNECOLOGY

## 2019-05-28 PROCEDURE — 99214 OFFICE O/P EST MOD 30 MIN: CPT | Performed by: OBSTETRICS & GYNECOLOGY

## 2019-05-28 PROCEDURE — 86901 BLOOD TYPING SEROLOGIC RH(D): CPT | Performed by: OBSTETRICS & GYNECOLOGY

## 2019-05-28 RX ORDER — OXYCODONE HYDROCHLORIDE AND ACETAMINOPHEN 5; 325 MG/1; MG/1
1 TABLET ORAL EVERY 4 HOURS PRN
Status: DISCONTINUED | OUTPATIENT
Start: 2019-05-28 | End: 2019-05-31 | Stop reason: HOSPADM

## 2019-05-28 RX ORDER — OXYCODONE HYDROCHLORIDE AND ACETAMINOPHEN 5; 325 MG/1; MG/1
2 TABLET ORAL EVERY 4 HOURS PRN
Status: DISCONTINUED | OUTPATIENT
Start: 2019-05-28 | End: 2019-05-31 | Stop reason: HOSPADM

## 2019-05-28 RX ORDER — SODIUM CHLORIDE, SODIUM LACTATE, POTASSIUM CHLORIDE, CALCIUM CHLORIDE 600; 310; 30; 20 MG/100ML; MG/100ML; MG/100ML; MG/100ML
125 INJECTION, SOLUTION INTRAVENOUS CONTINUOUS
Status: DISCONTINUED | OUTPATIENT
Start: 2019-05-28 | End: 2019-05-28

## 2019-05-28 RX ORDER — ONDANSETRON 2 MG/ML
4 INJECTION INTRAMUSCULAR; INTRAVENOUS EVERY 6 HOURS PRN
Status: DISCONTINUED | OUTPATIENT
Start: 2019-05-28 | End: 2019-05-28

## 2019-05-28 RX ORDER — CALCIUM CARBONATE 200(500)MG
1000 TABLET,CHEWABLE ORAL DAILY PRN
Status: DISCONTINUED | OUTPATIENT
Start: 2019-05-28 | End: 2019-05-28

## 2019-05-28 RX ORDER — CALCIUM CARBONATE 200(500)MG
1000 TABLET,CHEWABLE ORAL DAILY PRN
Status: DISCONTINUED | OUTPATIENT
Start: 2019-05-28 | End: 2019-05-31 | Stop reason: HOSPADM

## 2019-05-28 RX ORDER — IBUPROFEN 600 MG/1
600 TABLET ORAL EVERY 6 HOURS PRN
Status: DISCONTINUED | OUTPATIENT
Start: 2019-05-28 | End: 2019-05-31 | Stop reason: HOSPADM

## 2019-05-28 RX ORDER — NALBUPHINE HCL 10 MG/ML
5 AMPUL (ML) INJECTION ONCE
Status: DISCONTINUED | OUTPATIENT
Start: 2019-05-28 | End: 2019-05-28

## 2019-05-28 RX ORDER — DOCUSATE SODIUM 100 MG/1
100 CAPSULE, LIQUID FILLED ORAL 2 TIMES DAILY
Status: DISCONTINUED | OUTPATIENT
Start: 2019-05-28 | End: 2019-05-31 | Stop reason: HOSPADM

## 2019-05-28 RX ORDER — ACETAMINOPHEN 325 MG/1
650 TABLET ORAL EVERY 4 HOURS PRN
Status: DISCONTINUED | OUTPATIENT
Start: 2019-05-28 | End: 2019-05-28

## 2019-05-28 RX ORDER — LIDOCAINE HYDROCHLORIDE 10 MG/ML
10 INJECTION, SOLUTION INFILTRATION; PERINEURAL ONCE
Status: DISCONTINUED | OUTPATIENT
Start: 2019-05-28 | End: 2019-05-28

## 2019-05-28 RX ORDER — OXYCODONE HYDROCHLORIDE AND ACETAMINOPHEN 5; 325 MG/1; MG/1
1 TABLET ORAL EVERY 4 HOURS PRN
Status: DISCONTINUED | OUTPATIENT
Start: 2019-05-28 | End: 2019-05-28

## 2019-05-28 RX ORDER — LIDOCAINE HYDROCHLORIDE 10 MG/ML
10 INJECTION, SOLUTION INFILTRATION; PERINEURAL ONCE
Status: COMPLETED | OUTPATIENT
Start: 2019-05-28 | End: 2019-05-28

## 2019-05-28 RX ORDER — IBUPROFEN 600 MG/1
600 TABLET ORAL EVERY 6 HOURS PRN
Status: DISCONTINUED | OUTPATIENT
Start: 2019-05-28 | End: 2019-05-28

## 2019-05-28 RX ORDER — DOCUSATE SODIUM 100 MG/1
100 CAPSULE, LIQUID FILLED ORAL 2 TIMES DAILY
Status: DISCONTINUED | OUTPATIENT
Start: 2019-05-28 | End: 2019-05-28

## 2019-05-28 RX ORDER — BETAMETHASONE SODIUM PHOSPHATE AND BETAMETHASONE ACETATE 3; 3 MG/ML; MG/ML
12 INJECTION, SUSPENSION INTRA-ARTICULAR; INTRALESIONAL; INTRAMUSCULAR; SOFT TISSUE ONCE
Status: DISCONTINUED | OUTPATIENT
Start: 2019-05-28 | End: 2019-05-28 | Stop reason: HOSPADM

## 2019-05-28 RX ORDER — DIAPER,BRIEF,INFANT-TODD,DISP
1 EACH MISCELLANEOUS AS NEEDED
Status: DISCONTINUED | OUTPATIENT
Start: 2019-05-28 | End: 2019-05-28

## 2019-05-28 RX ORDER — OXYCODONE HYDROCHLORIDE AND ACETAMINOPHEN 5; 325 MG/1; MG/1
2 TABLET ORAL EVERY 4 HOURS PRN
Status: DISCONTINUED | OUTPATIENT
Start: 2019-05-28 | End: 2019-05-28

## 2019-05-28 RX ORDER — OXYTOCIN/RINGER'S LACTATE 30/500 ML
2 PLASTIC BAG, INJECTION (ML) INTRAVENOUS
Status: DISCONTINUED | OUTPATIENT
Start: 2019-05-28 | End: 2019-05-28

## 2019-05-28 RX ORDER — DIAPER,BRIEF,INFANT-TODD,DISP
1 EACH MISCELLANEOUS AS NEEDED
Status: DISCONTINUED | OUTPATIENT
Start: 2019-05-28 | End: 2019-05-31 | Stop reason: HOSPADM

## 2019-05-28 RX ORDER — LIDOCAINE HYDROCHLORIDE 10 MG/ML
INJECTION, SOLUTION EPIDURAL; INFILTRATION; INTRACAUDAL; PERINEURAL
Status: COMPLETED
Start: 2019-05-28 | End: 2019-05-28

## 2019-05-28 RX ORDER — BETAMETHASONE SODIUM PHOSPHATE AND BETAMETHASONE ACETATE 3; 3 MG/ML; MG/ML
12 INJECTION, SUSPENSION INTRA-ARTICULAR; INTRALESIONAL; INTRAMUSCULAR; SOFT TISSUE ONCE
Status: DISCONTINUED | OUTPATIENT
Start: 2019-05-28 | End: 2019-05-28

## 2019-05-28 RX ORDER — BETAMETHASONE SODIUM PHOSPHATE AND BETAMETHASONE ACETATE 3; 3 MG/ML; MG/ML
12 INJECTION, SUSPENSION INTRA-ARTICULAR; INTRALESIONAL; INTRAMUSCULAR; SOFT TISSUE EVERY 24 HOURS
Status: DISCONTINUED | OUTPATIENT
Start: 2019-05-29 | End: 2019-05-28

## 2019-05-28 RX ORDER — ACETAMINOPHEN 325 MG/1
650 TABLET ORAL EVERY 4 HOURS PRN
Status: DISCONTINUED | OUTPATIENT
Start: 2019-05-28 | End: 2019-05-31 | Stop reason: HOSPADM

## 2019-05-28 RX ADMIN — SODIUM CHLORIDE, SODIUM LACTATE, POTASSIUM CHLORIDE, AND CALCIUM CHLORIDE 500 ML: .6; .31; .03; .02 INJECTION, SOLUTION INTRAVENOUS at 13:25

## 2019-05-28 RX ADMIN — IBUPROFEN 600 MG: 600 TABLET ORAL at 20:54

## 2019-05-28 RX ADMIN — LIDOCAINE HYDROCHLORIDE 300 MG: 10 INJECTION, SOLUTION EPIDURAL; INFILTRATION; INTRACAUDAL; PERINEURAL at 19:45

## 2019-05-28 RX ADMIN — SODIUM CHLORIDE, SODIUM LACTATE, POTASSIUM CHLORIDE, AND CALCIUM CHLORIDE 1000 ML: .6; .31; .03; .02 INJECTION, SOLUTION INTRAVENOUS at 12:31

## 2019-05-28 RX ADMIN — SODIUM CHLORIDE, SODIUM LACTATE, POTASSIUM CHLORIDE, AND CALCIUM CHLORIDE 125 ML/HR: .6; .31; .03; .02 INJECTION, SOLUTION INTRAVENOUS at 14:39

## 2019-05-28 RX ADMIN — Medication 2 MILLI-UNITS/MIN: at 13:28

## 2019-05-28 RX ADMIN — SODIUM CHLORIDE 5 MILLION UNITS: 0.9 INJECTION, SOLUTION INTRAVENOUS at 15:45

## 2019-05-28 RX ADMIN — LIDOCAINE HYDROCHLORIDE 300 MG: 10 INJECTION, SOLUTION INFILTRATION; PERINEURAL at 19:45

## 2019-05-28 RX ADMIN — BENZOCAINE AND LEVOMENTHOL: 200; 5 SPRAY TOPICAL at 22:01

## 2019-05-28 RX ADMIN — BETAMETHASONE SODIUM PHOSPHATE AND BETAMETHASONE ACETATE 12 MG: 3; 3 INJECTION, SUSPENSION INTRA-ARTICULAR; INTRALESIONAL; INTRAMUSCULAR at 10:30

## 2019-05-29 LAB
BACTERIA UR CULT: ABNORMAL
BACTERIA UR CULT: ABNORMAL
RPR SER QL: NORMAL

## 2019-05-29 PROCEDURE — 99024 POSTOP FOLLOW-UP VISIT: CPT | Performed by: OBSTETRICS & GYNECOLOGY

## 2019-05-29 RX ADMIN — DOCUSATE SODIUM 100 MG: 100 CAPSULE, LIQUID FILLED ORAL at 08:04

## 2019-05-29 RX ADMIN — IBUPROFEN 600 MG: 600 TABLET ORAL at 04:29

## 2019-05-29 RX ADMIN — DOCUSATE SODIUM 100 MG: 100 CAPSULE, LIQUID FILLED ORAL at 17:59

## 2019-05-30 VITALS
TEMPERATURE: 98.3 F | HEIGHT: 70 IN | SYSTOLIC BLOOD PRESSURE: 139 MMHG | WEIGHT: 210 LBS | HEART RATE: 82 BPM | RESPIRATION RATE: 18 BRPM | DIASTOLIC BLOOD PRESSURE: 72 MMHG | OXYGEN SATURATION: 98 % | BODY MASS INDEX: 30.06 KG/M2

## 2019-05-30 LAB
C TRACH DNA SPEC QL NAA+PROBE: NEGATIVE
GP B STREP DNA SPEC QL NAA+PROBE: ABNORMAL
N GONORRHOEA DNA SPEC QL NAA+PROBE: NEGATIVE

## 2019-05-30 PROCEDURE — 99024 POSTOP FOLLOW-UP VISIT: CPT | Performed by: OBSTETRICS & GYNECOLOGY

## 2019-05-30 RX ADMIN — DOCUSATE SODIUM 100 MG: 100 CAPSULE, LIQUID FILLED ORAL at 09:59

## 2019-06-26 ENCOUNTER — POSTPARTUM VISIT (OUTPATIENT)
Dept: OBGYN CLINIC | Facility: MEDICAL CENTER | Age: 39
End: 2019-06-26

## 2019-06-26 VITALS — WEIGHT: 190 LBS | BODY MASS INDEX: 27.66 KG/M2 | DIASTOLIC BLOOD PRESSURE: 80 MMHG | SYSTOLIC BLOOD PRESSURE: 120 MMHG

## 2019-06-26 PROBLEM — Z34.93 ENCOUNTER FOR SUPERVISION OF NORMAL PREGNANCY IN THIRD TRIMESTER: Status: RESOLVED | Noted: 2019-04-18 | Resolved: 2019-06-26

## 2019-06-26 PROBLEM — Z3A.34 34 WEEKS GESTATION OF PREGNANCY: Status: RESOLVED | Noted: 2019-05-28 | Resolved: 2019-06-26

## 2019-06-26 PROBLEM — O09.523 MULTIGRAVIDA OF ADVANCED MATERNAL AGE IN THIRD TRIMESTER: Status: RESOLVED | Noted: 2019-04-18 | Resolved: 2019-06-26

## 2019-06-26 PROBLEM — Z87.59 HISTORY OF GESTATIONAL HYPERTENSION: Status: RESOLVED | Noted: 2019-01-03 | Resolved: 2019-06-26

## 2019-06-26 PROCEDURE — 99024 POSTOP FOLLOW-UP VISIT: CPT | Performed by: PHYSICIAN ASSISTANT

## 2019-06-26 RX ORDER — ACETAMINOPHEN AND CODEINE PHOSPHATE 120; 12 MG/5ML; MG/5ML
1 SOLUTION ORAL DAILY
Qty: 90 TABLET | Refills: 4 | Status: SHIPPED | OUTPATIENT
Start: 2019-06-26 | End: 2019-09-27 | Stop reason: ALTCHOICE

## 2019-09-27 ENCOUNTER — ANNUAL EXAM (OUTPATIENT)
Dept: OBGYN CLINIC | Facility: MEDICAL CENTER | Age: 39
End: 2019-09-27
Payer: COMMERCIAL

## 2019-09-27 VITALS
SYSTOLIC BLOOD PRESSURE: 118 MMHG | BODY MASS INDEX: 26.77 KG/M2 | WEIGHT: 187 LBS | DIASTOLIC BLOOD PRESSURE: 78 MMHG | HEIGHT: 70 IN

## 2019-09-27 DIAGNOSIS — Z12.31 ENCOUNTER FOR SCREENING MAMMOGRAM FOR MALIGNANT NEOPLASM OF BREAST: Primary | ICD-10-CM

## 2019-09-27 DIAGNOSIS — Z01.419 ROUTINE GYNECOLOGICAL EXAMINATION: ICD-10-CM

## 2019-09-27 PROBLEM — O42.919 PRETERM PREMATURE RUPTURE OF MEMBRANES, ANTEPARTUM: Status: RESOLVED | Noted: 2019-05-28 | Resolved: 2019-09-27

## 2019-09-27 PROCEDURE — S0612 ANNUAL GYNECOLOGICAL EXAMINA: HCPCS | Performed by: PHYSICIAN ASSISTANT

## 2019-09-27 NOTE — PROGRESS NOTES
Assessment/Plan:    Routine gynecological examination  I have discussed the importance of monthly self-breast exams, exercise and healthy diet as well as adequate intake of calcium and vitamin D  The patient declines STD testing  The current ASCCP guidelines were reviewed  The low risk patient will receive pap smear screening every 3 years or pap with HPV co-testing every 5 years  The patient previously had PAP in 2017 and is due again in 3 years  I emphasized the importance of an annual pelvic and breast exam  A yearly mammogram is recommended for breast cancer screening starting at age 36  All questions have been answered to her satisfaction  Advised to wait until d/c breastfeeding to sched mammo           Diagnoses and all orders for this visit:    Encounter for screening mammogram for malignant neoplasm of breast  -     Mammo screening bilateral w 3d & cad; Future    Routine gynecological examination        Subjective:      Patient ID: Jessica Carranza is a 35 y o  female  The patient comes in today for annual Gyn exam  The patient has no history of abnormal periods, pelvic pain, abnormal vaginal discharge, breast mass or lumps, urinary symptoms, urinary incontinence, depression, and pelvic/vaginal pressure  Pt reports all additional systems reviewed are negative  The patient admits to monthly self breast exams, regular exercise, healthy diet, contraception use (condoms), and calcium and Vitamin D intake  Last PAP 2017, WNL  HPV neg  Currently breastfeeding - daughter turning 2 mo old  Doing well   Advised to wait to complete mammo until d/c breastfeeding  No return of menses  Did not start POP as rx'd at [facility] exam - using condoms for contra          The following portions of the patient's history were reviewed and updated as appropriate: allergies, current medications, past family history, past medical history, past social history, past surgical history and problem list     Review of Systems Constitutional: Negative for appetite change, fatigue and unexpected weight change  Respiratory: Negative for chest tightness and shortness of breath  Cardiovascular: Negative for chest pain, palpitations and leg swelling  Gastrointestinal: Negative for abdominal pain, constipation, diarrhea, nausea and vomiting  Genitourinary: Negative for difficulty urinating, dyspareunia, menstrual problem, pelvic pain and vaginal discharge  Musculoskeletal: Negative for arthralgias and myalgias  Neurological: Negative for dizziness, light-headedness and headaches  Psychiatric/Behavioral: Negative for dysphoric mood  The patient is not nervous/anxious  All other systems reviewed and are negative  Objective:      /78 (BP Location: Left arm, Patient Position: Sitting, Cuff Size: Standard)   Ht 5' 9 5" (1 765 m)   Wt 84 8 kg (187 lb)   BMI 27 22 kg/m²          Physical Exam   Constitutional: She is oriented to person, place, and time  She appears well-developed and well-nourished  HENT:   Head: Normocephalic and atraumatic  Neck: Neck supple  No thyromegaly present  Cardiovascular: Normal rate and regular rhythm  Pulmonary/Chest: Effort normal and breath sounds normal  Right breast exhibits no inverted nipple, no mass, no nipple discharge, no skin change and no tenderness  Left breast exhibits no inverted nipple, no mass, no nipple discharge, no skin change and no tenderness  No breast tenderness, discharge or bleeding  Abdominal: Soft  Bowel sounds are normal  Hernia confirmed negative in the right inguinal area and confirmed negative in the left inguinal area  Genitourinary: Vagina normal and uterus normal  No breast tenderness, discharge or bleeding  There is no rash, tenderness, lesion or injury on the right labia  There is no rash, tenderness, lesion or injury on the left labia  Uterus is not deviated, not enlarged, not fixed and not tender   Cervix exhibits no motion tenderness, no discharge and no friability  Right adnexum displays no mass, no tenderness and no fullness  Left adnexum displays no mass, no tenderness and no fullness  No vaginal discharge found  Neurological: She is alert and oriented to person, place, and time  Skin: Skin is warm and dry  Psychiatric: She has a normal mood and affect  Nursing note and vitals reviewed

## 2019-09-27 NOTE — ASSESSMENT & PLAN NOTE
I have discussed the importance of monthly self-breast exams, exercise and healthy diet as well as adequate intake of calcium and vitamin D  The patient declines STD testing  The current ASCCP guidelines were reviewed  The low risk patient will receive pap smear screening every 3 years or pap with HPV co-testing every 5 years  The patient previously had PAP in 2017 and is due again in 3 years  I emphasized the importance of an annual pelvic and breast exam  A yearly mammogram is recommended for breast cancer screening starting at age 36  All questions have been answered to her satisfaction      Advised to wait until d/c breastfeeding to sched mammo

## 2020-06-18 ENCOUNTER — TELEPHONE (OUTPATIENT)
Dept: OBGYN CLINIC | Facility: CLINIC | Age: 40
End: 2020-06-18

## 2020-06-18 DIAGNOSIS — N30.00 ACUTE CYSTITIS WITHOUT HEMATURIA: Primary | ICD-10-CM

## 2020-06-18 DIAGNOSIS — R39.9 UTI SYMPTOMS: Primary | ICD-10-CM

## 2020-06-18 RX ORDER — NITROFURANTOIN 25; 75 MG/1; MG/1
100 CAPSULE ORAL 2 TIMES DAILY
Qty: 14 CAPSULE | Refills: 0 | Status: SHIPPED | OUTPATIENT
Start: 2020-06-18 | End: 2020-06-25

## 2020-06-18 NOTE — TELEPHONE ENCOUNTER
Hi Dr Marval Heimlich,    I had a question for you regarding this pt since she is scheduled to see you next in Oct  For annual  She is c/o UTI symptoms- burning, frequency, pain  Denies any abdominal pain or fever  Does notice an odor  This just started last night  She is worried per note below about medications since she is breastfeeding her 3 yr old daughter  I told her typically once our pts deliver, care goes into the pediatrician, but I was advised to ask you just in case  Should pt consult with her PCP office/pediatrician for this? Do you want an order for a UA and UC if you decide to take this task? No known allergies on chart  Please advise to clinical bin, thank you much!

## 2020-06-18 NOTE — TELEPHONE ENCOUNTER
Pt called in regards to UTI symptoms  Pt is currently breast feeding her 1yr old daughter   Was very concerned with taking a medication for a UTI and if it will be safe please advise rx [facility]

## 2020-06-18 NOTE — TELEPHONE ENCOUNTER
Hi - I sent her an antibiotic that is safe w/ breasfeeding  Can you also have her give a urine culture? Thank you!

## 2020-06-18 NOTE — TELEPHONE ENCOUNTER
Called pt and informed of below  I instructed pt to go to any [facility] lab first to give a urine specimen before taking her Rx  I told her once she goes to lab and gives specimen she can go to pharmacy to  and start taking medication  I also told pt that unless she hears otherwise, she can take medication and needs to complete it  If she needs to start another medication per the results of UC, she will be notified  She verbalized understanding

## 2020-11-04 ENCOUNTER — ANNUAL EXAM (OUTPATIENT)
Dept: OBGYN CLINIC | Facility: MEDICAL CENTER | Age: 40
End: 2020-11-04
Payer: COMMERCIAL

## 2020-11-04 VITALS
SYSTOLIC BLOOD PRESSURE: 152 MMHG | BODY MASS INDEX: 27.98 KG/M2 | DIASTOLIC BLOOD PRESSURE: 78 MMHG | WEIGHT: 192.2 LBS | TEMPERATURE: 98 F

## 2020-11-04 DIAGNOSIS — Z01.419 ENCOUNTER FOR GYNECOLOGICAL EXAMINATION (GENERAL) (ROUTINE) WITHOUT ABNORMAL FINDINGS: Primary | ICD-10-CM

## 2020-11-04 DIAGNOSIS — Z11.51 SCREENING FOR HPV (HUMAN PAPILLOMAVIRUS): ICD-10-CM

## 2020-11-04 PROCEDURE — 87624 HPV HI-RISK TYP POOLED RSLT: CPT | Performed by: NURSE PRACTITIONER

## 2020-11-04 PROCEDURE — G0145 SCR C/V CYTO,THINLAYER,RESCR: HCPCS | Performed by: NURSE PRACTITIONER

## 2020-11-04 PROCEDURE — S0612 ANNUAL GYNECOLOGICAL EXAMINA: HCPCS | Performed by: NURSE PRACTITIONER

## 2020-11-05 LAB
HPV HR 12 DNA CVX QL NAA+PROBE: NEGATIVE
HPV16 DNA CVX QL NAA+PROBE: NEGATIVE
HPV18 DNA CVX QL NAA+PROBE: NEGATIVE

## 2020-11-10 LAB
LAB AP GYN PRIMARY INTERPRETATION: NORMAL
Lab: NORMAL

## 2022-01-27 ENCOUNTER — ANNUAL EXAM (OUTPATIENT)
Dept: OBGYN CLINIC | Facility: MEDICAL CENTER | Age: 42
End: 2022-01-27
Payer: COMMERCIAL

## 2022-01-27 VITALS
SYSTOLIC BLOOD PRESSURE: 132 MMHG | DIASTOLIC BLOOD PRESSURE: 80 MMHG | WEIGHT: 198.8 LBS | BODY MASS INDEX: 28.46 KG/M2 | HEIGHT: 70 IN

## 2022-01-27 DIAGNOSIS — Z01.419 ENCOUNTER FOR ANNUAL ROUTINE GYNECOLOGICAL EXAMINATION: Primary | ICD-10-CM

## 2022-01-27 PROCEDURE — S0612 ANNUAL GYNECOLOGICAL EXAMINA: HCPCS | Performed by: PHYSICIAN ASSISTANT

## 2022-01-27 NOTE — PROGRESS NOTES
Assessment   42 y o  [id number] presenting for annual exam      Plan   Diagnoses and all orders for this visit:    Encounter for annual routine gynecological examination        Pap due 2025  Has Mammo slip    Reviewed breast awareness / SBE  Encouraged 150 min of exercise per week  Reviewed balanced diet  Vitamin D and Calcium supplement recommended  RTO one year for yearly exam or sooner as needed  __________________________________________________________________    Subjective     Evan Corona is a 42 y o  [id number] with regular menses who is sexually active with  presenting for annual exam  She is without complaint  STD testing: She does not want STD testing today  SCREENING  Last Pap: 11/04/2020 NILM/Neg   Last Mammo: Has order but has not yet gone as she is still breastfeeding    GYN  Complaints: denies  Denies dysmenorrhea, dyspareunia, genital discharge, genital ulcers, irregular/heavy menses, pelvic pain and vulvar/vaginal symptoms  Menarche at age 14  Periods are regular every 28-30 days, lasting 5 days  Dysmenorrhea:mild, occurring premenstrually and first 1-2 days of flow  Cyclic symptoms include breast tenderness and moodiness  Sexually active: Yes - single partner -   Hx STI: denies   Hx Abnormal pap: denies    We reviewed [facility]P guidelines for Pap testing today  This low risk patient meets criteria for q 5 year testing  OB  [id number]         Complaints: denies  Denies change in urinary stream, dysuria, hematuria, nocturia and urinary frequency/urgency    BREAST  Complaints: denies  Denies: breast lump, breast tenderness, dryness, nipple discharge, pruritus, skin color change and skin lesion(s)  Personal hx: None  Family hx: Denies fhx of breast, uterine, ovarian, or colon cancers      GENERAL  PMH reviewed/updated and is as below  Patient does not follow with a PCP      Past Medical History:   Diagnosis Date    Gestational hypertension     History of multiple miscarriages     x2     Spontaneous      Varicella        Past Surgical History:   Procedure Laterality Date    TOOTH EXTRACTION      VAGINAL DELIVERY  ,     WISDOM TOOTH EXTRACTION Bilateral          Current Outpatient Medications:     Prenatal Vit-Fe Fumarate-FA (PRENATAL VITAMIN PO), Take by mouth, Disp: , Rfl:     No Known Allergies    Social History     Socioeconomic History    Marital status: /Civil Union     Spouse name: Not on file    Number of children: Not on file    Years of education: Not on file    Highest education level: Not on file   Occupational History    Not on file   Tobacco Use    Smoking status: Never Smoker    Smokeless tobacco: Never Used   Vaping Use    Vaping Use: Never used   Substance and Sexual Activity    Alcohol use: Yes     Comment: Wine     Drug use: No    Sexual activity: Yes     Partners: Male     Birth control/protection: Condom     Comment: Kj   Other Topics Concern    Not on file   Social History Narrative    Not on file     Social Determinants of Health     Financial Resource Strain: Not on file   Food Insecurity: Not on file   Transportation Needs: Not on file   Physical Activity: Not on file   Stress: Not on file   Social Connections: Not on file   Intimate Partner Violence: Not on file   Housing Stability: Not on file       Review of Systems     ROS:  Constitutional: Negative for appetite change, fatigue and unexpected weight change  Respiratory: Negative for cough, wheezing, shortness of breath  Cardiovascular: Negative for chest pain, palpitations, and leg swelling  Gastrointestinal: Negative for abdominal pain and change in bowel habits/constipation/diarrhea  Breasts:  Negative for tenderness, pain or masses  Genitourinary: Negative for difficulty urinating, pelvic pain, vaginal bleeding, vaginal discharge, itching or odor  Psychiatric: Negative for mood difficulties        Objective      /80 (BP Location: Left arm, Patient Position: Sitting)   Ht 5' 9 5" (1 765 m)   Wt 90 2 kg (198 lb 12 8 oz)   LMP 01/06/2022   BMI 28 94 kg/m²     Physical Examination:    Patient appears well and is not in distress  Neck is supple without masses  Breasts are symmetrical without mass, tenderness, nipple discharge, skin changes or adenopathy  Heart: RRR  Lungs: CTA b/l  Abdomen is soft and nontender without masses  External genitals are normal without lesions or rashes  Urethral meatus and urethra are normal  Bladder is normal to palpation  Vagina is normal without discharge or bleeding  Cervix is normal without discharge or lesion  Uterus is normal, mobile, nontender without palpable mass  Adnexa are normal, nontender, without palpable mass

## 2023-01-31 ENCOUNTER — ANNUAL EXAM (OUTPATIENT)
Dept: OBGYN CLINIC | Facility: MEDICAL CENTER | Age: 43
End: 2023-01-31

## 2023-01-31 VITALS
DIASTOLIC BLOOD PRESSURE: 88 MMHG | BODY MASS INDEX: 30.81 KG/M2 | WEIGHT: 208 LBS | HEIGHT: 69 IN | SYSTOLIC BLOOD PRESSURE: 138 MMHG

## 2023-01-31 DIAGNOSIS — Z12.31 ENCOUNTER FOR SCREENING MAMMOGRAM FOR MALIGNANT NEOPLASM OF BREAST: ICD-10-CM

## 2023-01-31 DIAGNOSIS — Z01.419 ROUTINE GYNECOLOGICAL EXAMINATION: Primary | ICD-10-CM

## 2023-01-31 NOTE — PROGRESS NOTES
Assessment   36 y o  [id number] presenting for annual exam      Plan   Diagnoses and all orders for this visit:    Routine gynecological examination  Normal findings on routine exam   Encouraged 150 min of exercise per week  Reviewed balanced diet  Multivitamin encouraged   Breast awareness/SBE encouraged     Encounter for screening mammogram for malignant neoplasm of breast  -     Mammo screening bilateral w 3d & cad; Future        Pap due 2025  Mammo slip given   Colonoscopy - baseline due at age 38        RTO one year for yearly exam or sooner as needed  __________________________________________________________________    Subjective     Cris Stack is a 36 y o  [id number] presenting for annual exam  She is without complaint and does not want STD testing today  Kids are well - oldest [facility] [employer]  Brianne Perdomo is home with her youngest, who is now 1  She is excited for kids to be out of school and hopeful that when her daughter starts pre k she will have a little more time to herself  Stopped breastfeeding 6 months ago and is feeling great that she was able to continue for so long! Has difficulty with motivation to exercise but is unhappy with 10 lbs weight gain in the past year and planning to evaluate diet and increase activity after today's appt  SCREENING  Last Pap: 11/04/2020 NILM/hpv neg  Last Mammo: n/a - postponed due to breastfeeding  Last Colonoscopy: n/a      GYN  Periods are monthly, lasting 3 days  Not heavy  Dysmenorrhea:none  Cyclic symptoms include none    Sexually active: Yes - single partner -   Contraception: condoms  Hx STI: denies     Hx Abnormal pap: denies  We reviewed ASCCP guidelines for Pap testing today  Denies vaginal discharge, itching, odor, dyspareunia, pelvic pain and vulvar/vaginal symptoms      OB  [id number]       Complaints: denies   Denies urgency, frequency, hematuria, leakage / change in stream, difficulty urinating  BREAST  Complaints: denies   Denies: breast lump, breast tenderness, nipple discharge, skin color change, and skin lesion(s)  Personal hx: none reported      Pertinent Family Hx:   Family hx of breast cancer: no  Family hx of ovarian cancer: no  Family hx of colon cancer: no      GENERAL  PMH reviewed/updated and is as below  Patient does follow with a PCP  Exercise: none      Past Medical History:   Diagnosis Date   • Gestational hypertension    • History of multiple miscarriages     x2    • Spontaneous     • Varicella        Past Surgical History:   Procedure Laterality Date   • TOOTH EXTRACTION     • VAGINAL DELIVERY  ,    • WISDOM TOOTH EXTRACTION Bilateral          Current Outpatient Medications:   •  Prenatal Vit-Fe Fumarate-FA (PRENATAL VITAMIN PO), Take by mouth (Patient not taking: Reported on 2023), Disp: , Rfl:     No Known Allergies    Social History     Socioeconomic History   • Marital status: /Civil Union     Spouse name: Not on file   • Number of children: Not on file   • Years of education: Not on file   • Highest education level: Not on file   Occupational History   • Not on file   Tobacco Use   • Smoking status: Never   • Smokeless tobacco: Never   Vaping Use   • Vaping Use: Never used   Substance and Sexual Activity   • Alcohol use: Yes     Comment: Wine    • Drug use: No   • Sexual activity: Yes     Partners: Male     Birth control/protection: Condom     Comment: Kj   Other Topics Concern   • Not on file   Social History Narrative   • Not on file     Social Determinants of Health     Financial Resource Strain: Not on file   Food Insecurity: Not on file   Transportation Needs: Not on file   Physical Activity: Not on file   Stress: Not on file   Social Connections: Not on file   Intimate Partner Violence: Not on file   Housing Stability: Not on file       Review of Systems    ROS:  Constitutional: Negative for fatigue and unexpected weight change  Respiratory: Negative for cough and shortness of breath  Cardiovascular: Negative for chest pain and palpitations  Gastrointestinal: Negative for abdominal pain and change in bowel habits  Breasts:  Negative, other than as noted above  Genitourinary: Negative, other than as noted above  Psychiatric: Negative for mood difficulties  Objective      /80 (BP Location: Left arm, Patient Position: Sitting, Cuff Size: Large)   Ht 5' 9" (1 753 m)   Wt 94 3 kg (208 lb)   LMP 12/25/2022   Breastfeeding No   BMI 30 72 kg/m²     Physical Examination:    Patient appears well and is not in distress  Neck is supple without masses  Breasts are symmetrical without mass, tenderness, nipple discharge, skin changes or adenopathy  Abdomen is soft and nontender without masses  External genitals are normal without lesions or rashes  Urethral meatus and urethra are normal  Bladder is normal to palpation  Vagina is normal without discharge or bleeding  Cervix is normal without discharge or lesion  Uterus is normal, mobile, nontender without palpable mass  Adnexa are normal, nontender, without palpable mass

## 2023-03-09 ENCOUNTER — HOSPITAL ENCOUNTER (OUTPATIENT)
Dept: RADIOLOGY | Facility: MEDICAL CENTER | Age: 43
Discharge: HOME | End: 2023-03-09

## 2023-03-09 VITALS — HEIGHT: 69 IN | WEIGHT: 208 LBS | BODY MASS INDEX: 30.81 KG/M2

## 2023-03-09 DIAGNOSIS — Z12.31 ENCOUNTER FOR SCREENING MAMMOGRAM FOR MALIGNANT NEOPLASM OF BREAST: ICD-10-CM

## 2024-04-16 ENCOUNTER — ANNUAL EXAM (OUTPATIENT)
Dept: OBGYN CLINIC | Facility: MEDICAL CENTER | Age: 44
End: 2024-04-16
Payer: COMMERCIAL

## 2024-04-16 VITALS
SYSTOLIC BLOOD PRESSURE: 116 MMHG | DIASTOLIC BLOOD PRESSURE: 82 MMHG | BODY MASS INDEX: 30.55 KG/M2 | WEIGHT: 213.4 LBS | HEIGHT: 70 IN

## 2024-04-16 DIAGNOSIS — Z01.419 ROUTINE GYNECOLOGICAL EXAMINATION: Primary | ICD-10-CM

## 2024-04-16 DIAGNOSIS — Z12.31 ENCOUNTER FOR SCREENING MAMMOGRAM FOR MALIGNANT NEOPLASM OF BREAST: ICD-10-CM

## 2024-04-16 DIAGNOSIS — Z12.11 ENCOUNTER FOR SCREENING FOR MALIGNANT NEOPLASM OF COLON: ICD-10-CM

## 2024-04-16 PROCEDURE — S0612 ANNUAL GYNECOLOGICAL EXAMINA: HCPCS | Performed by: PHYSICIAN ASSISTANT

## 2024-04-16 NOTE — PROGRESS NOTES
Assessment   43 y.o.  presenting for annual exam.     Plan   Diagnoses and all orders for this visit:    Routine gynecological examination  Normal findings on routine exam.  Encouraged 150 min of exercise per week.  Reviewed balanced diet.   Multivitamin encouraged   Breast awareness/SBE encouraged     Declines contraception for cyclic sx and contraception but will report back if this changes.     Encounter for screening mammogram for malignant neoplasm of breast  -     Mammo screening bilateral w 3d & cad; Future    Encounter for screening for malignant neoplasm of colon  -     Ambulatory referral to Gastroenterology; Future        Pap - due   Mammo slip given    Colonoscopy due - referral placed       RTO one year for yearly exam or sooner as needed.    __________________________________________________________________    Subjective     Melanie Huerta is a 43 y.o.  presenting for annual exam.     Son is 13, oldest daughter 10, and youngest is 4 - turning 5 in may. Youngest just broke her arm and had to be in a cast for 4 weeks - trampoline injury. Melanie is starting to exercise more -- walking x 45 min 3 x per week when she takes her daughter to pre school. Feels like she is starting to get into a routine and next looking at diet.       SCREENING  Last Pap: 2020 NILM/hpv neg  Last Mammo: 2023 BIRADS 1 - Negative  Last Colonoscopy: n/a     GYN    Periods are regular, monthly, lasting 5 days.  Dysmenorrhea:mild, occurring premenstrually.   Cyclic symptoms include irritability and moodiness    Sexually active: Yes - single partner -   Concerns: denies pain, bleeding, dryness   Contraception: condoms - happy with this and declines alternate method.     Hx Abnormal pap: denies  We reviewed ASCCP guidelines for Pap testing today.    Denies vaginal discharge, itching, odor, dyspareunia, pelvic pain and vulvar/vaginal symptoms    OB         Complaints: denies   Denies  urgency, frequency, hematuria, leakage / change in stream, difficulty urinating.     BREAST  Complaints: denies   Denies: breast lump, breast tenderness, nipple discharge, skin color change, and skin lesion(s)    Pertinent Family Hx:   Family hx of breast cancer: no  Family hx of ovarian cancer: no  Family hx of colon cancer: no      GENERAL  PMH reviewed/updated and is as below.     Past Medical History:   Diagnosis Date    Gestational hypertension     History of multiple miscarriages     x2     Spontaneous      Varicella        Past Surgical History:   Procedure Laterality Date    TOOTH EXTRACTION      VAGINAL DELIVERY  ,     WISDOM TOOTH EXTRACTION Bilateral          Current Outpatient Medications:     Prenatal Vit-Fe Fumarate-FA (PRENATAL VITAMIN PO), Take by mouth (Patient not taking: Reported on 2023), Disp: , Rfl:     No Known Allergies    Social History     Socioeconomic History    Marital status: /Civil Union     Spouse name: Not on file    Number of children: Not on file    Years of education: Not on file    Highest education level: Not on file   Occupational History    Not on file   Tobacco Use    Smoking status: Never    Smokeless tobacco: Never   Vaping Use    Vaping status: Never Used   Substance and Sexual Activity    Alcohol use: Yes     Alcohol/week: 3.0 standard drinks of alcohol     Types: 3 Glasses of wine per week     Comment: Wine     Drug use: No    Sexual activity: Yes     Partners: Male     Birth control/protection: Condom     Comment: Kj   Other Topics Concern    Not on file   Social History Narrative    Not on file     Social Determinants of Health     Financial Resource Strain: Not on file   Food Insecurity: Not on file   Transportation Needs: Not on file   Physical Activity: Not on file   Stress: Not on file   Social Connections: Not on file   Intimate Partner Violence: Not on file   Housing Stability: Not on file       Review of Systems  "    Constitutional: Negative.   Respiratory: Negative.    Cardiovascular: Negative   Gastrointestinal: Negative   Breasts: As noted above.   Genitourinary: As noted above.   Psychiatric: Negative     Objective      /82 (BP Location: Left arm, Patient Position: Sitting, Cuff Size: Standard)   Ht 5' 10\" (1.778 m)   Wt 96.8 kg (213 lb 6.4 oz)   LMP 03/25/2024   BMI 30.62 kg/m²     Physical Examination:    Patient appears well and is not in distress  Breasts are symmetrical without mass, tenderness, nipple discharge, skin changes or adenopathy.   Abdomen is soft and nontender without masses.   External genitals are normal without lesions or rashes.  Urethral meatus and urethra are normal  Bladder is normal to palpation  Vagina is normal without discharge or bleeding.   Cervix is normal without discharge or lesion.   Uterus is normal, mobile, nontender without palpable mass.  Adnexa are normal, nontender, without palpable mass.             "

## 2024-05-14 ENCOUNTER — HOSPITAL ENCOUNTER (OUTPATIENT)
Dept: RADIOLOGY | Facility: MEDICAL CENTER | Age: 44
Discharge: HOME | End: 2024-05-14
Payer: COMMERCIAL

## 2024-05-14 VITALS — HEIGHT: 70 IN | WEIGHT: 213 LBS | BODY MASS INDEX: 30.49 KG/M2

## 2024-05-14 DIAGNOSIS — Z12.31 ENCOUNTER FOR SCREENING MAMMOGRAM FOR MALIGNANT NEOPLASM OF BREAST: ICD-10-CM

## 2024-05-14 PROCEDURE — 77067 SCR MAMMO BI INCL CAD: CPT

## 2024-05-14 PROCEDURE — 77063 BREAST TOMOSYNTHESIS BI: CPT

## 2024-05-15 ENCOUNTER — TELEPHONE (OUTPATIENT)
Age: 44
End: 2024-05-15

## 2024-05-15 NOTE — TELEPHONE ENCOUNTER
Results reviewed with pt. She will contact her insurance to discuss the ABUS and will call back if she does wish to proceed. Pt has no further questions at this time.

## 2025-04-21 ENCOUNTER — TELEPHONE (OUTPATIENT)
Age: 45
End: 2025-04-21

## 2025-04-21 NOTE — TELEPHONE ENCOUNTER
A user error has taken place: encounter opened in error, closed for administrative reasons.  Pt was going to r/s appt but no longer wanted too due to next for her provider in Oct.